# Patient Record
Sex: MALE | Race: WHITE
[De-identification: names, ages, dates, MRNs, and addresses within clinical notes are randomized per-mention and may not be internally consistent; named-entity substitution may affect disease eponyms.]

---

## 2018-01-18 ENCOUNTER — HOSPITAL ENCOUNTER (EMERGENCY)
Dept: HOSPITAL 80 - FED | Age: 63
Discharge: HOME | End: 2018-01-18
Payer: COMMERCIAL

## 2018-01-18 VITALS
SYSTOLIC BLOOD PRESSURE: 131 MMHG | DIASTOLIC BLOOD PRESSURE: 89 MMHG | OXYGEN SATURATION: 94 % | HEART RATE: 77 BPM | RESPIRATION RATE: 16 BRPM

## 2018-01-18 VITALS — TEMPERATURE: 98.4 F

## 2018-01-18 DIAGNOSIS — J44.9: ICD-10-CM

## 2018-01-18 DIAGNOSIS — R53.83: Primary | ICD-10-CM

## 2018-01-18 DIAGNOSIS — Z87.891: ICD-10-CM

## 2018-01-18 DIAGNOSIS — E86.9: ICD-10-CM

## 2018-01-18 LAB
CK SERPL-CCNC: 266 IU/L (ref 0–224)
INR PPP: 1.06 (ref 0.83–1.16)
PLATELET # BLD: 228 10^3/UL (ref 150–400)
PROTHROMBIN TIME: 14 SEC (ref 12–15)

## 2018-01-18 NOTE — EDPHY
H & P


Time Seen by Provider: 01/18/18 11:39


HPI/ROS: 





HPI


Fatigue, lethargy.





62-year-old male by private vehicle with family.  This patient reports that he 

has chronic knee pain and has had a knee replacement.  For this he takes a long-

acting narcotic pain medication called Nucynta.  He reports that he had some 

knee pain last night.  He took a Nucynta last night at approximately 5:30 p.m..

  He reports that he woke this morning and felt more fatigued than usual.  He 

reports that he went to work.  He took 2 Aleve at 11:00 a.m..  He reports that 

since this time he is felt very fatigued and foggy in his head which she 

describes as slow thinking.  He denies any other associated signs or symptoms.





ROS:





Constitutional:  No fever, no chills.  As above.


Eyes:  No changes in vision.


ENT:  No sore throat.  No nasal congestion or rhinorrhea.


Respiratory:  No cough.  No shortness of breath.


Cardiac:  No chest pain, no palpitations.


Gastrointestinal:  No abdominal pain, no vomiting, no diarrhea.


Genitourinary:  No hematuria.  No dysuria or increased frequency with urination.


Musculoskeletal:  No back pain.  No neck pain.  No myalgias or arthralgias.


Skin:  No rashes.


Neurological:  No headache.  No focal weakness or altered sensation.





Past medical history:  Bronchitis, lower extremity edema, COPD, osteoarthritis, 

left knee replacement, cholecystectomy, chronic leg pain.





Social history:  Former smoker.  Denies alcohol.  Here with family.  As above.





Physical Exam:





General Appearance:  Alert, no distress. Moderately obese.  This patient is 

responding to questions appropriately and in full sentences.  This patient 

appears well-hydrated and well-nourished.


Eyes:  Pupils equal and round no pallor or injection.  No lid edema, erythema 

or injection.


ENT, Mouth:  Mucous membranes are moist.  The pharyngeal tissues are 

unremarkable.  No edema or swelling.  No asymmetry suggestive of abscess.  No 

erythema or exudates.


Respiratory:  There are no retractions, lungs are clear to auscultation with 

good air movement bilaterally. No tachypnea.


Cardiovascular:  Regular rate and rhythm.  No murmur.


Gastrointestinal:  Obese habitus.  Abdomen is soft and nontender, no masses, 

bowel sounds normal.  No focal tenderness at McBurney's point.  No Sanabria sign.


Neurological:  Motor sensory function is grossly intact.  Cranial nerves are 

normal.  Gait is normal.


Skin:  Warm and dry, no rashes.


Musculoskeletal:  Neck is supple and nontender. No pain on flexion of his neck.


Extremities are symmetrical.  All joints range without pain or impingement.


Psychiatric:  No agitation.  No depression.





Database:





EKG:





EKG time is 11:17 a.m.; EKG shows a narrow complex normal sinus rhythm with a 

ventricular rate of 68.  The DC, QRS, QT intervals are within normal limits.  

There are no ST-T wave changes indicative of ischemic or injury pattern.  No 

evidence of right heart strain.  Interpreted by me.





Imaging:





Chest x-ray PA and lateral; the cardiac mediastinal silhouette is unremarkable. 

Grossly stable reticular nodular opacities.  No evidence of infiltrate or 

pneumothorax.  No acute cardiopulmonary disease process noted.  Interpreted by 

me.





Procedures:





Emergency department course:





Vital signs reviewed and are normal. Patient is afebrile.  EKG obtained and 

reviewed by myself.





2:00 p.m., patient re-evaluated.  Resting comfortably at this time.  States he 

feels better.  His emergency department workup has been unremarkable.  I 

reviewed the differential with him and his family.  At this time I feel that he 

most likely had a medication reaction to his long-acting opiate pain 

medication.  I also asked him was possible he took 1 dose for got that he took 

it and then took another dose.  He said that this was a possibility.  He has 

been up and ambulatory to the bathroom without difficulty and with a normal 

gait.  He does feel comfortable going home with his family at this time.  I 

feel he is safe for discharge. I stressed the importance of returning to the 

emergency department immediately if his symptoms return or he has any serious 

concerns.  I discussed follow-up with his primary care physician.  All of his 

questions were answered.  He was discharged in good condition.





Differential Diagnosis:





The differential diagnosis on this patient includes but is not limited to 

medication reaction to long-acting narcotic pain medication, viral syndrome, 

influenza.  Congestive heart failure exacerbation, acute coronary syndrome, CVA 

unlikely.  This represents a partial list of diagnoses considered.  These 

considerations are based on history, physical exam, past history, reassessment 

and diagnostic testing.


Smoking Status: Former smoker


Constitutional: 


 Initial Vital Signs











Temperature (C)  36.9 C   01/18/18 10:56


 


Heart Rate  84   01/18/18 10:56


 


Respiratory Rate  18   01/18/18 10:56


 


Blood Pressure  134/88 H  01/18/18 10:56


 


O2 Sat (%)  97   01/18/18 10:56








 











O2 Delivery Mode               Room Air














Allergies/Adverse Reactions: 


 





codeine [Codeine] Allergy (Mild, Verified 01/18/18 10:56)


 


hydrocodone [Hydrocodone] Allergy (Verified 01/18/18 10:56)


 


morphine [Morphine] Allergy (Verified 01/18/18 10:56)


 


oxycodone [Oxycodone] Allergy (Verified 01/18/18 10:56)


 








Home Medications: 














 Medication  Instructions  Recorded


 


Tapentadol HCl [Nucynta 50 MG (RX)] 50 mg PO Q4-6PRN PRN #20 tab 01/05/15














Medical Decision Making





- Diagnostics


Imaging Results: 


 Imaging Impressions





Chest X-Ray  01/18/18 11:40


Impression: 


1. No acute findings in the chest.


2. Grossly stable reticular nodular opacities, with limited assessment of 

nodules seen better on the comparison CT chest.


3. Additional findings as above.


 


 


 














- Data Points


Laboratory Results: 


 Laboratory Results





 01/18/18 11:18 





 01/18/18 11:18 





 











  01/18/18 01/18/18 01/18/18





  13:25 12:40 12:00


 


WBC      





    


 


RBC      





    


 


Hgb      





    


 


Hct      





    


 


MCV      





    


 


MCH      





    


 


MCHC      





    


 


RDW      





    


 


Plt Count      





    


 


MPV      





    


 


Neut % (Auto)      





    


 


Lymph % (Auto)      





    


 


Mono % (Auto)      





    


 


Eos % (Auto)      





    


 


Baso % (Auto)      





    


 


Nucleat RBC Rel Count      





    


 


Absolute Neuts (auto)      





    


 


Absolute Lymphs (auto)      





    


 


Absolute Monos (auto)      





    


 


Absolute Eos (auto)      





    


 


Absolute Basos (auto)      





    


 


Absolute Nucleated RBC      





    


 


Immature Gran %      





    


 


Immature Gran #      





    


 


PT      14.0 SEC SEC





     (12.0-15.0) 


 


INR      1.06 





     (0.83-1.16) 


 


APTT      28.7 SEC SEC





     (23.0-38.0) 


 


D-Dimer      0.38 ug/mLFEU ug/mLFEU





     (0.00-0.50) 


 


Carboxyhemoglobin  2.0 % H %    





   (0-1.5)   


 


Sodium      





    


 


Potassium      





    


 


Chloride      





    


 


Carbon Dioxide      





    


 


Anion Gap      





    


 


BUN      





    


 


Creatinine      





    


 


Estimated GFR      





    


 


Glucose      





    


 


Calcium      





    


 


Creatine Kinase      





    


 


CK-MB (CK-2) Fraction      





    


 


CK-MB (CK-2) %      





    


 


Creatine Kinase Interp      





    


 


Troponin I      





    


 


NT-Pro-B Natriuret Pep      





    


 


Nasal Influenza A PCR    NEGATIVE FOR FLU A   





    (NEGATIVE)  


 


Nasal Influenza B PCR    NEGATIVE FOR FLU B   





    (NEGATIVE)  














  01/18/18 01/18/18





  11:18 11:18


 


WBC    6.54 10^3/uL 10^3/uL





    (3.80-9.50) 


 


RBC    4.95 10^6/uL 10^6/uL





    (4.40-6.38) 


 


Hgb    14.3 g/dL g/dL





    (13.7-17.5) 


 


Hct    41.6 % %





    (40.0-51.0) 


 


MCV    84.0 fL fL





    (81.5-99.8) 


 


MCH    28.9 pg pg





    (27.9-34.1) 


 


MCHC    34.4 g/dL g/dL





    (32.4-36.7) 


 


RDW    13.6 % %





    (11.5-15.2) 


 


Plt Count    228 10^3/uL 10^3/uL





    (150-400) 


 


MPV    8.2 fL L fL





    (8.7-11.7) 


 


Neut % (Auto)    66.5 % %





    (39.3-74.2) 


 


Lymph % (Auto)    17.4 % %





    (15.0-45.0) 


 


Mono % (Auto)    12.2 % %





    (4.5-13.0) 


 


Eos % (Auto)    3.1 % %





    (0.6-7.6) 


 


Baso % (Auto)    0.5 % %





    (0.3-1.7) 


 


Nucleat RBC Rel Count    0.0 % %





    (0.0-0.2) 


 


Absolute Neuts (auto)    4.35 10^3/uL 10^3/uL





    (1.70-6.50) 


 


Absolute Lymphs (auto)    1.14 10^3/uL 10^3/uL





    (1.00-3.00) 


 


Absolute Monos (auto)    0.80 10^3/uL 10^3/uL





    (0.30-0.80) 


 


Absolute Eos (auto)    0.20 10^3/uL 10^3/uL





    (0.03-0.40) 


 


Absolute Basos (auto)    0.03 10^3/uL 10^3/uL





    (0.02-0.10) 


 


Absolute Nucleated RBC    0.00 10^3/uL 10^3/uL





    (0-0.01) 


 


Immature Gran %    0.3 % %





    (0.0-1.1) 


 


Immature Gran #    0.02 10^3/uL 10^3/uL





    (0.00-0.10) 


 


PT    





   


 


INR    





   


 


APTT    





   


 


D-Dimer    





   


 


Carboxyhemoglobin    





   


 


Sodium  142 mEq/L mEq/L  





   (135-145)  


 


Potassium  4.4 mEq/L mEq/L  





   (3.5-5.2)  


 


Chloride  102 mEq/L mEq/L  





   ()  


 


Carbon Dioxide  26 mEq/l mEq/l  





   (22-31)  


 


Anion Gap  14 mEq/L mEq/L  





   (8-16)  


 


BUN  15 mg/dL mg/dL  





   (7-23)  


 


Creatinine  1.2 mg/dL mg/dL  





   (0.7-1.3)  


 


Estimated GFR  > 60   





   


 


Glucose  94 mg/dL mg/dL  





   ()  


 


Calcium  10.5 mg/dL H mg/dL  





   (8.5-10.4)  


 


Creatine Kinase  266 IU/L H IU/L  





   (0-224)  


 


CK-MB (CK-2) Fraction  2.63 ng/mL ng/mL  





   (0.00-3.19)  


 


CK-MB (CK-2) %  1.0 % %  





   (0.0-4.0)  


 


Creatine Kinase Interp  NEGATIVE   





   (NEGATIVE)  


 


Troponin I  < 0.012 ng/mL ng/mL  





   (0.000-0.034)  


 


NT-Pro-B Natriuret Pep  46 pg/mL pg/mL  





   (0-125)  


 


Nasal Influenza A PCR    





   


 


Nasal Influenza B PCR    





   











Medications Given: 


 








Discontinued Medications





Sodium Chloride (Ns)  500 mls @ 1,000 mls/hr IV EDNOW ONE


   PRN Reason: Protocol


   Stop: 01/18/18 12:08


   Last Admin: 01/18/18 12:03 Dose:  500 mls








Departure





- Departure


Disposition: Home, Routine, Self-Care


Clinical Impression: 


 Fatigue, Possible medication reaction





Condition: Good


Instructions:  Narcotic Pain Management (ED), Fatigue (ED)


Additional Instructions: 


Read and follow provided instructions.





Follow-up with your primary care physician within 1-2 days for re-evaluation as 

discussed.





Take your medication as prescribed.





Return to the emergency department immediately for worsening symptoms, 

worsening fatigue, headache, loss of sensation or weakness in your extremities, 

fever, difficulty breathing or other serious concerns.


Referrals: 


Jere Rios,  [Primary Care Provider] - As per Instructions

## 2018-01-18 NOTE — CPEKG
Heart Rate: 68

RR Interval: 882

P-R Interval: 172

QRSD Interval: 92

QT Interval: 392

QTC Interval: 417

P Axis: 30

QRS Axis: -19

T Wave Axis: 37

EKG Severity - OTHERWISE NORMAL ECG -

EKG Impression: SINUS RHYTHM

EKG Impression: BORDERLINE LEFT AXIS DEVIATION

Electronically Signed By: McCollester, Laughlin 18-Jan-2018 14:18:12

## 2018-05-16 ENCOUNTER — HOSPITAL ENCOUNTER (EMERGENCY)
Dept: HOSPITAL 80 - FED | Age: 63
Discharge: HOME | End: 2018-05-16
Payer: COMMERCIAL

## 2018-05-16 VITALS — DIASTOLIC BLOOD PRESSURE: 82 MMHG | SYSTOLIC BLOOD PRESSURE: 130 MMHG

## 2018-05-16 DIAGNOSIS — Z87.891: ICD-10-CM

## 2018-05-16 DIAGNOSIS — J44.9: ICD-10-CM

## 2018-05-16 DIAGNOSIS — M54.5: Primary | ICD-10-CM

## 2018-05-16 NOTE — EDPHY
H & P


Stated Complaint: months of chronic back pain/increasing lately/missing work


Time Seen by Provider: 05/16/18 14:56


HPI/ROS: 





CHIEF COMPLAINT:  Low back pain





HISTORY OF PRESENT ILLNESS:  The patient is a 62-year-old obese man who comes 

to the emergency department complaining of progressively worse low back pain 

over the last month.  He states that he is now missing work.  He states that he 

has numbness in his feet if he sits too long and that improves when he stands 

up and walks.  He points to his lower thoracic/upper lumbar spine as the area 

of pain.  He states that he has chronic back problems but he felt a pop about a 

month ago and has had severe pain ever since.  He presented to his primary Dr. Rios who recommended acupuncture but he has not been able to do so yet.  He 

has been taking Aleve at home with moderate improvement.  No fevers.








REVIEW OF SYSTEMS:


Constitutional:  denies: chills, fever, recent illness, recent injury


EENTM: denies: blurred vision, double vision, nose congestion


Respiratory: denies: cough, shortness of breath


Cardiac: denies: chest pain, irregular heart rate, lightheadedness, palpitations


Gastrointestinal/Abdominal: denies: abdominal pain, diarrhea, nausea, vomiting, 

blood streaked stools


Genitourinary: denies: dysuria, frequency, hematuria, pain


Musculoskeletal: denies: joint pain, muscle pain


Skin: denies: lesions, rash, jaundice, bruising


Neurological: denies: headache, numbness, paresthesia, tingling, dizziness, 

weakness


Hematologic/Lymphatic: denies: blood clots, easy bleeding, easy bruising


Immunologic/allergic: denies: HIV/AIDS, transplant








EXAM:


GENERAL:  Well-appearing, well-nourished and in no acute distress.


HEAD:  Atraumatic, normocephalic.


EYES:  Pupils equal round and reactive to light, extraocular movements intact, 

sclera anicteric, conjunctiva are normal.


ENT:  TMs normal, nares patent, oropharynx clear without exudates.  Moist 

mucous membranes.


NECK:  Normal range of motion, supple without lymphadenopathy or JVD.


LUNGS:  Breath sounds clear to auscultation bilaterally and equal.  No wheezes 

rales or rhonchi.


HEART:  Regular rate and rhythm without murmurs, rubs or gallops.


ABDOMEN:  Soft, nontender, normoactive bowel sounds.  No guarding, no rebound.  

No masses appreciated.


BACK:  Lower thoracic, upper lumbar pain and tenderness, no deformities or step-

offs.  No CVA tenderness, 


EXTREMITIES:  Normal range of motion, no pitting or edema.  No clubbing or 

cyanosis.  Normal pulses


NEUROLOGICAL:  Cranial nerves II through XII grossly intact.  Normal speech, 

normal gait.  5/5 strength, normal movement in all extremities, normal 

sensation when objectively tested.  Complains of numbness in his feet when 

sitting too long. 


PSYCH:  Normal mood, normal affect.


SKIN:  Warm, dry, normal turgor, no visible rashes or lesions.








Source: Patient


Exam Limitations: No limitations





- Personal History


Current Tetanus/Diphtheria Vaccine: Yes





- Medical/Surgical History


Hx Asthma: No


Hx Chronic Respiratory Disease: Yes


Hx Diabetes: No


Hx Cardiac Disease: No


Hx Renal Disease: No


Hx Cirrhosis: No


Hx Alcoholism: No


Hx HIV/AIDS: No


Hx Splenectomy or Spleen Trauma: No


Other PMH: bronchitis, edema, COPD,JEROME.  left knee replacement, ana.  chronic 

leg pain.  back problems





- Family History


Significant Family History: No pertinent family hx





- Social History


Smoking Status: Former smoker


Alcohol Use: Sober


Drug Use: None


Constitutional: 


 Initial Vital Signs











Temperature (C)  37 C   05/16/18 13:26


 


Heart Rate  79   05/16/18 13:26


 


Respiratory Rate  18   05/16/18 13:26


 


Blood Pressure  152/86 H  05/16/18 13:26


 


O2 Sat (%)  99   05/16/18 13:26








 











O2 Delivery Mode               Room Air














Allergies/Adverse Reactions: 


 





codeine [Codeine] Allergy (Mild, Verified 05/16/18 13:25)


 


hydrocodone [Hydrocodone] Allergy (Verified 05/16/18 13:25)


 


morphine [Morphine] Allergy (Verified 05/16/18 13:25)


 


oxycodone [Oxycodone] Allergy (Verified 05/16/18 13:25)


 








Home Medications: 














 Medication  Instructions  Recorded


 


Tapentadol HCl [Nucynta 50 MG (RX)] 50 mg PO Q4-6PRN PRN #20 tab 01/05/15


 


Aleve  05/16/18


 


Diazepam [Valium 5 MG (*)] 5 mg PO TID PRN #15 tab 05/16/18


 


methylPREDNISolone [Medrol Dose 1 each PO AD #1 ea 05/16/18





Daniel]  














Medical Decision Making





- Diagnostics


Imaging: Discussed imaging studies w/ On call Radiologist


ED Course/Re-evaluation: 





5:50 p.m. we discussed the MRI results.  The patient is relieved.  He is 

feeling much better after Valium.  I will discharge him with a Solu-Medrol pack 

and p.r.n. Valium.  He is also asking for a note for work.  We will refer him 

to back specialist.


Differential Diagnosis: 





Partial list of the Differential diagnosis considered include but were not 

limited to;  muscle strain, radiculopathy, degenerative disc disease and 

although unlikely based on the history and physical exam, I also considered 

cord compression, infection, hematoma, transverse myelitis.  I discussed these 

differential diagnoses and the plan with the patient as well as the usual and 

expected course.  The patient understands that the diagnosis is provisional and 

that in medicine we are not always correct and that further workup is often 

warranted.  Usual and customary warnings were given.  All of the patient's 

questions were answered.  The patient was instructed to return to the emergency 

department should the symptoms at all worsen or return, otherwise to followup 

with the physician as we discussed.





- Data Points


Medications Given: 


 








Discontinued Medications





Diazepam (Valium)  5 mg IVP EDNOW ONE


   Stop: 05/16/18 15:03


   Last Admin: 05/16/18 15:12 Dose:  5 mg


Methylprednisolone Sodium Succinate (Solu-Medrol)  125 mg IVP EDNOW ONE


   Stop: 05/16/18 15:03


   Last Admin: 05/16/18 15:12 Dose:  125 mg








Departure





- Departure


Disposition: Home, Routine, Self-Care


Clinical Impression: 


Low back pain


Qualifiers:


 Chronicity: acute Back pain laterality: midline Sciatica presence: without 

sciatica Qualified Code(s): M54.5 - Low back pain





Condition: Fair


Instructions:  Acute Low Back Pain (ED)


Referrals: 


Jere Rios DO [Primary Care Provider] - As per Instructions


Gavino Melissa MD [Medical Doctor] - As per Instructions


Stand Alone Forms:  Work Excuse


Prescriptions: 


Diazepam [Valium 5 MG (*)] 5 mg PO TID PRN #15 tab


 PRN Reason: Spasms


methylPREDNISolone [Medrol Dose Daniel] 1 each PO AD #1 ea

## 2018-08-30 ENCOUNTER — HOSPITAL ENCOUNTER (EMERGENCY)
Dept: HOSPITAL 80 - FED | Age: 63
Discharge: HOME | End: 2018-08-30
Payer: COMMERCIAL

## 2018-08-30 VITALS — DIASTOLIC BLOOD PRESSURE: 85 MMHG | SYSTOLIC BLOOD PRESSURE: 125 MMHG

## 2018-08-30 DIAGNOSIS — J44.1: Primary | ICD-10-CM

## 2018-08-30 DIAGNOSIS — Z87.891: ICD-10-CM

## 2018-08-30 LAB — PLATELET # BLD: 230 10^3/UL (ref 150–400)

## 2018-08-30 NOTE — CPEKG
Test Reason : OPEN

Blood Pressure : ***/*** mmHG

Vent. Rate : 082 BPM     Atrial Rate : 082 BPM

   P-R Int : 161 ms          QRS Dur : 094 ms

    QT Int : 377 ms       P-R-T Axes : 016 -26 038 degrees

   QTc Int : 441 ms

 

Sinus rhythm

Borderline left axis deviation

Abnormal R-wave progression, early transition

 

Confirmed by McCollester, Laughlin ([id number]) on 8/30/2018 12:17:20 PM

 

Referred By:             Confirmed By:Laughlin McCollester

## 2018-08-30 NOTE — EDPHY
H & P


Time Seen by Provider: 08/30/18 11:52


HPI/ROS: 





HPI


Shortness of breath.  COPD.





63-year-old male by ambulance from work.  The patient reports that he was at 

work and developed shortness of breath about 2 hr prior to arrival.  He states 

this is typical of his previous COPD exacerbations.  He reports that he took 2 

hits from his ProAir without relief.  He then called the ambulance.  An IV was 

established by EMS and he was given 125 mg of Solu-Medrol and a DuoNeb 

treatment.  He reports that after this he felt much better and on arrival to 

the emergency department now, he states that he is breathing"normal".  He 

denies cough.  No fever.  Denies chest pain.  No other complaints.





ROS:





Constitutional:  No fever, no chills.  No weakness.


Eyes:  No discharge.  No changes in vision.


ENT:  No sore throat.  No nasal congestion or rhinorrhea.


Respiratory:  No cough.  As above.


Cardiac:  No chest pain, no palpitations.


Gastrointestinal:  No abdominal pain, no vomiting, no diarrhea.


Skin:  No rashes.


Neurological:  No headache.  No focal weakness or altered sensation.





Past medical history:  Chronic bronchitis, COPD, lower extremity edema, 

osteoarthritis, left knee replacement, cholecystectomy, chronic bilateral leg 

pain.  





Social history: Former smoker.  Denies alcohol.  Here by himself.





Physical Exam:





General Appearance:  Alert, no distress.  This patient is responding to 

questions appropriately and in full sentences.  This patient appears well-

hydrated and well-nourished.


Eyes:  Pupils equal and round no pallor or injection.  No lid edema, erythema 

or injection.


Respiratory:  There are no retractions, lungs are clear to auscultation 

bilaterally with good air movement bilaterally.  No tachypnea.


Cardiovascular:  Regular rate and rhythm.  No murmur.


Gastrointestinal:  Abdomen is soft and nontender, no masses, bowel sounds 

normal.  No focal tenderness at McBurney's point.  No Sanabria sign.


Neurological:  Motor sensory function is grossly intact.  Cranial nerves are 

normal.  Gait is normal.


Skin:  Warm and dry, no rashes.


Musculoskeletal:  Neck is supple and nontender.


Symmetrical lower extremity edema which is chronic.  All joints range without 

pain or impingement.


Psychiatric:  No agitation.  No depression.





Database:





EKG:





EKG time is 12:04 p.m.; EKG shows a narrow complex normal sinus rhythm with a 

ventricular rate of 82. Borderline left axis deviation.  The DC, QRS, QT 

intervals are within normal limits.  There are no ST-T wave changes indicative 

of ischemic or injury pattern.  No evidence of right heart strain.  Interpreted 

by me.





Imaging:





Procedures:





Emergency department course:





Triage vital signs reviewed.  Room air pulse oximetry at this time is 94%.  He 

is afebrile.  Vital signs are otherwise within normal limits.  He is declining 

another breathing treatment.  He received 125 mg of IV Solu-Medrol by EMS EKG 

obtained and reviewed by myself.  Plan will be to observe the patient and then 

reassess on further management.  His presentation is consistent with a typical 

exacerbation of his COPD.





1:00 p.m., the patient was re-evaluated.  He stating that he is starting to 

feel short of breath again.  He also reports that he has had intermittent 

episodes of forgetfulness where he suddenly does not know where he is.  He 

reports that these have been going on for some time, at least a year but have 

been more frequent lately.  There has been no history of seizure activity.  He 

denies any loss of sensation or weakness in his extremities.  No headache.  No 

other associated complaints.  He will be given albuterol/Atrovent nebulizer 

treatment.





2:20 p.m., patient feeling much better.  Room air pulse oximetry 94-95%.  His 

lungs are clear on auscultation bilaterally.  No tachypnea.  I feel that his 

momentary lapses of clear mental status are likely secondary to dementia.  He 

states they have been going on for at least a year maybe 2 years now.  I will 

have him follow up with his primary care physician Dr. Rios for re-

evaluation and further management regarding this issue.  I will prescribe him a 

short course of prednisone.  He feels comfortable with this plan as does his 

son who is in the room.  Return to emergency department precautions were 

thoroughly discussed with both of them.  All of their questions were answered.  

The patient was discharged home in good condition with his son.











Differential Diagnosis:





The differential diagnosis on this patient includes but is not limited to COPD 

exacerbation.  Congestive heart failure exacerbation, pneumothorax, pneumonia, 

acute coronary syndrome unlikely.  This represents a partial list of diagnoses 

considered.  These considerations are based on history, physical exam, past 

history, reassessment and diagnostic testing.


Smoking Status: Former smoker


Constitutional: 


 Initial Vital Signs











Temperature (C)  36.6 C   08/30/18 11:56


 


Heart Rate  91   08/30/18 11:56


 


Respiratory Rate  13   08/30/18 11:56


 


Blood Pressure  130/92 H  08/30/18 11:56


 


O2 Sat (%)  93   08/30/18 11:56








 











O2 Delivery Mode               Room Air














Allergies/Adverse Reactions: 


 





codeine [Codeine] Allergy (Mild, Verified 05/16/18 13:25)


 


hydrocodone [Hydrocodone] Allergy (Verified 05/16/18 13:25)


 


morphine [Morphine] Allergy (Verified 05/16/18 13:25)


 


oxycodone [Oxycodone] Allergy (Verified 05/16/18 13:25)


 








Home Medications: 














 Medication  Instructions  Recorded


 


Aleve  05/16/18


 


Albuterol Sulfate  08/30/18


 


Asmanex Inh (*)  08/30/18


 


Aspirin [Aspirin 81mg (*)]  08/30/18


 


Atorvastatin Calcium  08/30/18


 


Calcium  08/30/18


 


DULoxetine  08/30/18


 


Gabapentin  08/30/18


 


Iron  08/30/18


 


Magnesium  08/30/18


 


Nucynta  08/30/18


 


Proair Hfa  08/30/18


 


Tamsulosin HCl  08/30/18


 


predniSONE [prednisone 20mg (RX)] 60 mg PO DAILY #12 tab 08/30/18














Medical Decision Making





- Data Points


Laboratory Results: 


 Laboratory Results





 08/30/18 11:45 





 08/30/18 11:45 





 











  08/30/18 08/30/18





  11:45 11:45


 


WBC    8.40 10^3/uL 10^3/uL





    (3.80-9.50) 


 


RBC    5.33 10^6/uL 10^6/uL





    (4.40-6.38) 


 


Hgb    16.2 g/dL g/dL





    (13.7-17.5) 


 


Hct    47.4 % %





    (40.0-51.0) 


 


MCV    88.9 fL fL





    (81.5-99.8) 


 


MCH    30.4 pg pg





    (27.9-34.1) 


 


MCHC    34.2 g/dL g/dL





    (32.4-36.7) 


 


RDW    13.0 % %





    (11.5-15.2) 


 


Plt Count    230 10^3/uL 10^3/uL





    (150-400) 


 


MPV    8.3 fL L fL





    (8.7-11.7) 


 


Neut % (Auto)    70.4 % %





    (39.3-74.2) 


 


Lymph % (Auto)    18.6 % %





    (15.0-45.0) 


 


Mono % (Auto)    8.1 % %





    (4.5-13.0) 


 


Eos % (Auto)    2.3 % %





    (0.6-7.6) 


 


Baso % (Auto)    0.4 % %





    (0.3-1.7) 


 


Nucleat RBC Rel Count    0.0 % %





    (0.0-0.2) 


 


Absolute Neuts (auto)    5.92 10^3/uL 10^3/uL





    (1.70-6.50) 


 


Absolute Lymphs (auto)    1.56 10^3/uL 10^3/uL





    (1.00-3.00) 


 


Absolute Monos (auto)    0.68 10^3/uL 10^3/uL





    (0.30-0.80) 


 


Absolute Eos (auto)    0.19 10^3/uL 10^3/uL





    (0.03-0.40) 


 


Absolute Basos (auto)    0.03 10^3/uL 10^3/uL





    (0.02-0.10) 


 


Absolute Nucleated RBC    0.00 10^3/uL 10^3/uL





    (0-0.01) 


 


Immature Gran %    0.2 % %





    (0.0-1.1) 


 


Immature Gran #    0.02 10^3/uL 10^3/uL





    (0.00-0.10) 


 


Sodium  139 mEq/L mEq/L  





   (135-145)  


 


Potassium  4.1 mEq/L mEq/L  





   (3.3-5.0)  


 


Chloride  103 mEq/L mEq/L  





   ()  


 


Carbon Dioxide  26 mEq/l mEq/l  





   (22-31)  


 


Anion Gap  10 mEq/L mEq/L  





   (8-16)  


 


BUN  12 mg/dL mg/dL  





   (7-23)  


 


Creatinine  1.0 mg/dL mg/dL  





   (0.7-1.3)  


 


Estimated GFR  > 60   





   


 


Glucose  95 mg/dL mg/dL  





   ()  


 


Calcium  10.7 mg/dL H mg/dL  





   (8.5-10.4)  


 


Phosphorus  2.9 mg/dL mg/dL  





   (2.5-4.5)  











Medications Given: 


 








Discontinued Medications





Albuterol/Ipratropium (Duoneb)  6 ml IH EDNOW ONE


   Stop: 08/30/18 13:12


   Last Admin: 08/30/18 13:20 Dose:  6 ml








Departure





- Departure


Disposition: Home, Routine, Self-Care


Clinical Impression: 


 Chronic obstructive pulmonary disease with acute exacerbation





Condition: Good


Instructions:  COPD (Chronic Obstructive Pulmonary Disease) (ED)


Additional Instructions: 


Read and follow provided instructions.





Follow-up with your primary care physician within the next 5 days for re-

evaluation of your COPD and discussion of your momentary lapses of memory.  

Explained this is for an emergency department follow-up.





Take prednisone medication as prescribed for you're COPD.  Continued to use 

your ProAir as prescribed.





Return to the emergency department for worsening symptoms or other serious 

concerns.


Referrals: 


Patient,NotPresent [Unknown] - As per Instructions


Prescriptions: 


predniSONE [prednisone 20mg (RX)] 60 mg PO DAILY #12 tab

## 2019-02-15 ENCOUNTER — HOSPITAL ENCOUNTER (OUTPATIENT)
Dept: HOSPITAL 80 - CIMAGING | Age: 64
End: 2019-02-15
Attending: INTERNAL MEDICINE
Payer: COMMERCIAL

## 2019-02-15 DIAGNOSIS — R91.1: Primary | ICD-10-CM

## 2019-02-15 DIAGNOSIS — R91.8: ICD-10-CM
